# Patient Record
Sex: MALE | Race: WHITE | Employment: FULL TIME | URBAN - NONMETROPOLITAN AREA
[De-identification: names, ages, dates, MRNs, and addresses within clinical notes are randomized per-mention and may not be internally consistent; named-entity substitution may affect disease eponyms.]

---

## 2020-10-27 ENCOUNTER — HOSPITAL ENCOUNTER (INPATIENT)
Age: 50
LOS: 3 days | Discharge: HOME | DRG: 639 | End: 2020-10-30
Attending: HOSPITALIST | Admitting: FAMILY MEDICINE

## 2020-10-27 ENCOUNTER — APPOINTMENT (OUTPATIENT)
Dept: GENERAL RADIOLOGY | Age: 50
DRG: 639 | End: 2020-10-27
Attending: HOSPITALIST

## 2020-10-27 LAB
ANION GAP SERPL CALCULATED.3IONS-SCNC: 22 MMOL/L (ref 7–19)
ANION GAP SERPL CALCULATED.3IONS-SCNC: 27 MMOL/L (ref 7–19)
BUN BLDV-MCNC: 27 MG/DL (ref 6–20)
BUN BLDV-MCNC: 29 MG/DL (ref 6–20)
CALCIUM SERPL-MCNC: 8.4 MG/DL (ref 8.6–10)
CALCIUM SERPL-MCNC: 8.7 MG/DL (ref 8.6–10)
CHLORIDE BLD-SCNC: 100 MMOL/L (ref 98–111)
CHLORIDE BLD-SCNC: 99 MMOL/L (ref 98–111)
CO2: 2 MMOL/L (ref 22–29)
CO2: 6 MMOL/L (ref 22–29)
CREAT SERPL-MCNC: 1 MG/DL (ref 0.5–1.2)
CREAT SERPL-MCNC: 1 MG/DL (ref 0.5–1.2)
GFR AFRICAN AMERICAN: >59
GFR AFRICAN AMERICAN: >59
GFR NON-AFRICAN AMERICAN: >60
GFR NON-AFRICAN AMERICAN: >60
GLUCOSE BLD-MCNC: 104 MG/DL (ref 70–99)
GLUCOSE BLD-MCNC: 132 MG/DL (ref 70–99)
GLUCOSE BLD-MCNC: 151 MG/DL (ref 70–99)
GLUCOSE BLD-MCNC: 154 MG/DL (ref 74–109)
GLUCOSE BLD-MCNC: 178 MG/DL (ref 70–99)
GLUCOSE BLD-MCNC: 186 MG/DL (ref 70–99)
GLUCOSE BLD-MCNC: 186 MG/DL (ref 70–99)
GLUCOSE BLD-MCNC: 189 MG/DL (ref 70–99)
GLUCOSE BLD-MCNC: 191 MG/DL (ref 70–99)
GLUCOSE BLD-MCNC: 196 MG/DL (ref 70–99)
GLUCOSE BLD-MCNC: 203 MG/DL (ref 70–99)
GLUCOSE BLD-MCNC: 203 MG/DL (ref 74–109)
GLUCOSE BLD-MCNC: 270 MG/DL (ref 70–99)
GLUCOSE BLD-MCNC: 59 MG/DL (ref 70–99)
GLUCOSE BLD-MCNC: 68 MG/DL (ref 70–99)
GLUCOSE BLD-MCNC: 77 MG/DL (ref 70–99)
MAGNESIUM: 1.6 MG/DL (ref 1.6–2.6)
MAGNESIUM: 1.8 MG/DL (ref 1.6–2.6)
PERFORMED ON: ABNORMAL
PERFORMED ON: NORMAL
PHOSPHORUS: 1.1 MG/DL (ref 2.5–4.5)
PHOSPHORUS: 2.3 MG/DL (ref 2.5–4.5)
POTASSIUM SERPL-SCNC: 3.6 MMOL/L (ref 3.5–5)
POTASSIUM SERPL-SCNC: 4.1 MMOL/L (ref 3.5–5)
REASON FOR REJECTION: NORMAL
REJECTED TEST: NORMAL
SARS-COV-2, PCR: NOT DETECTED
SODIUM BLD-SCNC: 128 MMOL/L (ref 136–145)
SODIUM BLD-SCNC: 128 MMOL/L (ref 136–145)

## 2020-10-27 PROCEDURE — 2000000000 HC ICU R&B

## 2020-10-27 PROCEDURE — C9113 INJ PANTOPRAZOLE SODIUM, VIA: HCPCS | Performed by: HOSPITALIST

## 2020-10-27 PROCEDURE — 6370000000 HC RX 637 (ALT 250 FOR IP): Performed by: INTERNAL MEDICINE

## 2020-10-27 PROCEDURE — 80048 BASIC METABOLIC PNL TOTAL CA: CPT

## 2020-10-27 PROCEDURE — 6360000002 HC RX W HCPCS: Performed by: INTERNAL MEDICINE

## 2020-10-27 PROCEDURE — 74018 RADEX ABDOMEN 1 VIEW: CPT

## 2020-10-27 PROCEDURE — U0003 INFECTIOUS AGENT DETECTION BY NUCLEIC ACID (DNA OR RNA); SEVERE ACUTE RESPIRATORY SYNDROME CORONAVIRUS 2 (SARS-COV-2) (CORONAVIRUS DISEASE [COVID-19]), AMPLIFIED PROBE TECHNIQUE, MAKING USE OF HIGH THROUGHPUT TECHNOLOGIES AS DESCRIBED BY CMS-2020-01-R: HCPCS

## 2020-10-27 PROCEDURE — 2580000003 HC RX 258: Performed by: INTERNAL MEDICINE

## 2020-10-27 PROCEDURE — 36415 COLL VENOUS BLD VENIPUNCTURE: CPT

## 2020-10-27 PROCEDURE — 2580000003 HC RX 258: Performed by: HOSPITALIST

## 2020-10-27 PROCEDURE — 82947 ASSAY GLUCOSE BLOOD QUANT: CPT

## 2020-10-27 PROCEDURE — 2500000003 HC RX 250 WO HCPCS: Performed by: INTERNAL MEDICINE

## 2020-10-27 PROCEDURE — 84100 ASSAY OF PHOSPHORUS: CPT

## 2020-10-27 PROCEDURE — 83735 ASSAY OF MAGNESIUM: CPT

## 2020-10-27 PROCEDURE — 6360000002 HC RX W HCPCS: Performed by: HOSPITALIST

## 2020-10-27 RX ORDER — POTASSIUM CHLORIDE 7.45 MG/ML
10 INJECTION INTRAVENOUS PRN
Status: DISCONTINUED | OUTPATIENT
Start: 2020-10-27 | End: 2020-10-30 | Stop reason: HOSPADM

## 2020-10-27 RX ORDER — INSULIN GLARGINE 100 [IU]/ML
INJECTION, SOLUTION SUBCUTANEOUS NIGHTLY
Status: ON HOLD | COMMUNITY
End: 2020-10-30 | Stop reason: HOSPADM

## 2020-10-27 RX ORDER — HYDROMORPHONE HYDROCHLORIDE 1 MG/ML
0.5 INJECTION, SOLUTION INTRAMUSCULAR; INTRAVENOUS; SUBCUTANEOUS EVERY 4 HOURS PRN
Status: DISCONTINUED | OUTPATIENT
Start: 2020-10-27 | End: 2020-10-30 | Stop reason: HOSPADM

## 2020-10-27 RX ORDER — PROMETHAZINE HYDROCHLORIDE 25 MG/ML
6.25 INJECTION, SOLUTION INTRAMUSCULAR; INTRAVENOUS EVERY 6 HOURS PRN
Status: DISCONTINUED | OUTPATIENT
Start: 2020-10-27 | End: 2020-10-30 | Stop reason: HOSPADM

## 2020-10-27 RX ORDER — SODIUM CHLORIDE 450 MG/100ML
INJECTION, SOLUTION INTRAVENOUS CONTINUOUS
Status: DISCONTINUED | OUTPATIENT
Start: 2020-10-27 | End: 2020-10-29

## 2020-10-27 RX ORDER — DEXTROSE AND SODIUM CHLORIDE 5; .45 G/100ML; G/100ML
INJECTION, SOLUTION INTRAVENOUS CONTINUOUS PRN
Status: DISCONTINUED | OUTPATIENT
Start: 2020-10-27 | End: 2020-10-30 | Stop reason: HOSPADM

## 2020-10-27 RX ORDER — SODIUM CHLORIDE 9 MG/ML
10 INJECTION INTRAVENOUS 2 TIMES DAILY
Status: DISCONTINUED | OUTPATIENT
Start: 2020-10-27 | End: 2020-10-30 | Stop reason: HOSPADM

## 2020-10-27 RX ORDER — DEXTROSE MONOHYDRATE 25 G/50ML
12.5 INJECTION, SOLUTION INTRAVENOUS PRN
Status: DISCONTINUED | OUTPATIENT
Start: 2020-10-27 | End: 2020-10-30 | Stop reason: HOSPADM

## 2020-10-27 RX ORDER — PANTOPRAZOLE SODIUM 40 MG/10ML
40 INJECTION, POWDER, LYOPHILIZED, FOR SOLUTION INTRAVENOUS 2 TIMES DAILY
Status: DISCONTINUED | OUTPATIENT
Start: 2020-10-27 | End: 2020-10-29

## 2020-10-27 RX ORDER — 0.9 % SODIUM CHLORIDE 0.9 %
15 INTRAVENOUS SOLUTION INTRAVENOUS ONCE
Status: DISCONTINUED | OUTPATIENT
Start: 2020-10-27 | End: 2020-10-29

## 2020-10-27 RX ORDER — MAGNESIUM SULFATE 1 G/100ML
1 INJECTION INTRAVENOUS PRN
Status: DISCONTINUED | OUTPATIENT
Start: 2020-10-27 | End: 2020-10-30 | Stop reason: HOSPADM

## 2020-10-27 RX ADMIN — SODIUM CHLORIDE, PRESERVATIVE FREE 10 ML: 5 INJECTION INTRAVENOUS at 20:38

## 2020-10-27 RX ADMIN — SODIUM CHLORIDE 2.73 UNITS/HR: 9 INJECTION, SOLUTION INTRAVENOUS at 14:15

## 2020-10-27 RX ADMIN — MAGNESIUM SULFATE HEPTAHYDRATE 1 G: 1 INJECTION, SOLUTION INTRAVENOUS at 22:43

## 2020-10-27 RX ADMIN — POTASSIUM CHLORIDE 10 MEQ: 7.46 INJECTION, SOLUTION INTRAVENOUS at 23:44

## 2020-10-27 RX ADMIN — MAGNESIUM SULFATE HEPTAHYDRATE 1 G: 1 INJECTION, SOLUTION INTRAVENOUS at 21:50

## 2020-10-27 RX ADMIN — HYDROMORPHONE HYDROCHLORIDE 0.5 MG: 1 INJECTION, SOLUTION INTRAMUSCULAR; INTRAVENOUS; SUBCUTANEOUS at 20:37

## 2020-10-27 RX ADMIN — POTASSIUM CHLORIDE 10 MEQ: 7.46 INJECTION, SOLUTION INTRAVENOUS at 22:47

## 2020-10-27 RX ADMIN — SODIUM PHOSPHATE, MONOBASIC, MONOHYDRATE 20 MMOL: 276; 142 INJECTION, SOLUTION INTRAVENOUS at 22:56

## 2020-10-27 RX ADMIN — PANTOPRAZOLE SODIUM 40 MG: 40 INJECTION, POWDER, FOR SOLUTION INTRAVENOUS at 20:37

## 2020-10-27 RX ADMIN — DEXTROSE AND SODIUM CHLORIDE: 5; 450 INJECTION, SOLUTION INTRAVENOUS at 11:10

## 2020-10-27 ASSESSMENT — PAIN SCALES - GENERAL
PAINLEVEL_OUTOF10: 4
PAINLEVEL_OUTOF10: 8

## 2020-10-27 ASSESSMENT — ENCOUNTER SYMPTOMS
CONSTIPATION: 0
ABDOMINAL PAIN: 1
VOMITING: 1
VOICE CHANGE: 0
COLOR CHANGE: 0
SHORTNESS OF BREATH: 1
DIARRHEA: 0
NAUSEA: 1
BACK PAIN: 0

## 2020-10-27 NOTE — H&P
Hospital Medicine H&P    Patient:  Cynthia Christian  MRN: [id number]    Consulting Physician: Hafsa Ramon DO  Reason for Consult: Medical Management  Primacy Care Physician: No primary care provider on file. HISTORY OF PRESENT ILLNESS:   The patient is a 53 y.o. male who was transferred from an outlying facility in [address] [place] [address] because of DKA. He states that he has been ill for about 3 days. He has had some vomiting. Diffuse abdominal pain. He was diagnosed with diabetes 5 years ago. He has been admitted for DKA in the past.  He was markedly acidemic at the outlying facility. He has been admitted to the ICU with the DKA pathway. Past Medical History:        Diagnosis Date    Diabetes mellitus ([facility] [address]     Hyperlipidemia     Hypertension        Past Surgical History:        Procedure Laterality Date    SKIN BIOPSY      1 MOLE       Medications: Scheduled Meds:   enoxaparin  40 mg Subcutaneous Q24H    sodium chloride  15 mL/kg Intravenous Once     Continuous Infusions:   sodium chloride      dextrose 5 % and 0.45 % NaCl 150 mL/hr at 10/27/20 1110    insulin 5.04 Units/hr (10/27/20 1807)     PRN Meds:.dextrose, potassium chloride, magnesium sulfate, sodium phosphate IVPB **OR** sodium phosphate IVPB **OR** sodium phosphate IVPB, dextrose 5 % and 0.45 % NaCl    Allergies:  Pcn [penicillins]    Social History:   TOBACCO:   reports that he has never smoked. He has never used smokeless tobacco.  ETOH:   reports current alcohol use of about 86.0 standard drinks of alcohol per week. Family History:       Problem Relation Age of Onset    Cancer Father     Diabetes Brother        ROS:  Review of Systems   Constitutional: Positive for activity change and fatigue. Negative for fever. HENT: Negative for congestion and voice change. Eyes: Negative for visual disturbance. Respiratory: Positive for shortness of breath. Cardiovascular: Negative for chest pain and leg swelling. Gastrointestinal: Positive for abdominal pain, nausea and vomiting. Negative for constipation and diarrhea. Endocrine: Negative for polyuria. Genitourinary: Negative for difficulty urinating and dysuria. Musculoskeletal: Positive for gait problem. Negative for back pain and neck pain. Skin: Negative for color change. Allergic/Immunologic: Negative for immunocompromised state. Neurological: Positive for weakness. Negative for dizziness and light-headedness. Psychiatric/Behavioral: The patient is not nervous/anxious. Physical Exam:    Vitals: /67   Pulse 95   Temp 96 °F (35.6 °C) (Temporal)   Resp 23   Ht 5' 6\" (1.676 m) Comment: 56  Wt 150 lb 8 oz (68.3 kg)   SpO2 100%   BMI 24.29 kg/m²     Physical Exam  Vitals signs and nursing note reviewed. Constitutional:       General: He is in acute distress. HENT:      Head: Normocephalic and atraumatic. Right Ear: External ear normal.      Left Ear: External ear normal.      Nose: Nose normal.      Mouth/Throat:      Mouth: Mucous membranes are moist.   Eyes:      Conjunctiva/sclera: Conjunctivae normal.      Pupils: Pupils are equal, round, and reactive to light. Neck:      Musculoskeletal: Neck supple. No neck rigidity. Cardiovascular:      Rate and Rhythm: Normal rate and regular rhythm. Heart sounds: Normal heart sounds. Pulmonary:      Breath sounds: Normal breath sounds. Comments: Tachypneic  Abdominal:      General: Abdomen is flat. Palpations: Abdomen is soft. Musculoskeletal:         General: No swelling. Skin:     General: Skin is warm and dry. Neurological:      Mental Status: He is oriented to person, place, and time. Psychiatric:         Mood and Affect: Mood normal.         Thought Content: Thought content normal.         CBC: No results for input(s): WBC, HGB, PLT in the last 72 hours.   BMP:    Recent Labs     10/27/20  1549   *   K 4.1   CL 99   CO2 2*   BUN 27*   CREATININE 1.0 GLUCOSE 203*     Hepatic: No results for input(s): AST, ALT, ALB, BILITOT, ALKPHOS in the last 72 hours. Troponin: No results for input(s): TROPONINI in the last 72 hours. BNP: No results for input(s): BNP in the last 72 hours. Lipids: No results for input(s): CHOL, HDL in the last 72 hours. Invalid input(s): LDLCALCU  ABGs: No results found for: PHART, PO2ART, MCK7BDV  INR: No results for input(s): INR in the last 72 hours. -----------------------------------------------------------------  No results found. Assessment and Plan     DKA. Admit to ICU. DKA pathway. Aggressive fluid resuscitation. Please document 40 minutes of critical care time for chart review, patient assessment and documentation.       Anthony Swift DO

## 2020-10-27 NOTE — PROGRESS NOTES
Moved self on to bed from stretcher. Settled into room with bedside monitor. accucheck 59.  Turned insulin drip off from transfering Megan Daniels Lock of events and arrival.

## 2020-10-27 NOTE — PROGRESS NOTES
Laquita Notice arrived to room # 149. Presented with: DKA  Mental Status: Patient is oriented, alert, coherent, logical and thought processes intact. Vitals:    10/27/20 1500   BP: 110/66   Pulse: 94   Resp: 30   Temp:    SpO2: 100%     Patient safety contract and falls prevention contract reviewed with patient No.  Oriented Patient to room. Call light within reach. Yes. Needs, issues or concerns expressed at this time: yes, need to go to bathroom urinal given*.       Electronically signed by Filipe Segal RN on 10/27/2020 at 3:31 PM

## 2020-10-28 LAB
ANION GAP SERPL CALCULATED.3IONS-SCNC: 11 MMOL/L (ref 7–19)
ANION GAP SERPL CALCULATED.3IONS-SCNC: 11 MMOL/L (ref 7–19)
ANION GAP SERPL CALCULATED.3IONS-SCNC: 12 MMOL/L (ref 7–19)
ANION GAP SERPL CALCULATED.3IONS-SCNC: 13 MMOL/L (ref 7–19)
ANION GAP SERPL CALCULATED.3IONS-SCNC: 17 MMOL/L (ref 7–19)
ANION GAP SERPL CALCULATED.3IONS-SCNC: 18 MMOL/L (ref 7–19)
BASE EXCESS ARTERIAL: -11.5 MMOL/L (ref -2–2)
BUN BLDV-MCNC: 21 MG/DL (ref 6–20)
BUN BLDV-MCNC: 23 MG/DL (ref 6–20)
BUN BLDV-MCNC: 23 MG/DL (ref 6–20)
BUN BLDV-MCNC: 26 MG/DL (ref 6–20)
BUN BLDV-MCNC: 28 MG/DL (ref 6–20)
BUN BLDV-MCNC: 29 MG/DL (ref 6–20)
CALCIUM SERPL-MCNC: 8.4 MG/DL (ref 8.6–10)
CALCIUM SERPL-MCNC: 8.5 MG/DL (ref 8.6–10)
CALCIUM SERPL-MCNC: 8.9 MG/DL (ref 8.6–10)
CALCIUM SERPL-MCNC: 9.1 MG/DL (ref 8.6–10)
CARBOXYHEMOGLOBIN ARTERIAL: 0.8 % (ref 0–5)
CHLORIDE BLD-SCNC: 100 MMOL/L (ref 98–111)
CHLORIDE BLD-SCNC: 101 MMOL/L (ref 98–111)
CHLORIDE BLD-SCNC: 105 MMOL/L (ref 98–111)
CHLORIDE BLD-SCNC: 99 MMOL/L (ref 98–111)
CO2: 10 MMOL/L (ref 22–29)
CO2: 11 MMOL/L (ref 22–29)
CO2: 15 MMOL/L (ref 22–29)
CO2: 15 MMOL/L (ref 22–29)
CO2: 16 MMOL/L (ref 22–29)
CO2: 17 MMOL/L (ref 22–29)
CREAT SERPL-MCNC: 1 MG/DL (ref 0.5–1.2)
CREAT SERPL-MCNC: 1 MG/DL (ref 0.5–1.2)
CREAT SERPL-MCNC: 1.1 MG/DL (ref 0.5–1.2)
CREAT SERPL-MCNC: 1.2 MG/DL (ref 0.5–1.2)
GFR AFRICAN AMERICAN: >59
GFR NON-AFRICAN AMERICAN: >60
GLUCOSE BLD-MCNC: 125 MG/DL (ref 70–99)
GLUCOSE BLD-MCNC: 127 MG/DL (ref 70–99)
GLUCOSE BLD-MCNC: 134 MG/DL (ref 74–109)
GLUCOSE BLD-MCNC: 144 MG/DL (ref 70–99)
GLUCOSE BLD-MCNC: 147 MG/DL (ref 70–99)
GLUCOSE BLD-MCNC: 153 MG/DL (ref 74–109)
GLUCOSE BLD-MCNC: 163 MG/DL (ref 70–99)
GLUCOSE BLD-MCNC: 166 MG/DL (ref 70–99)
GLUCOSE BLD-MCNC: 167 MG/DL (ref 70–99)
GLUCOSE BLD-MCNC: 176 MG/DL (ref 74–109)
GLUCOSE BLD-MCNC: 177 MG/DL (ref 70–99)
GLUCOSE BLD-MCNC: 185 MG/DL (ref 74–109)
GLUCOSE BLD-MCNC: 187 MG/DL (ref 70–99)
GLUCOSE BLD-MCNC: 197 MG/DL (ref 70–99)
GLUCOSE BLD-MCNC: 215 MG/DL (ref 74–109)
GLUCOSE BLD-MCNC: 221 MG/DL (ref 74–109)
GLUCOSE BLD-MCNC: 223 MG/DL (ref 70–99)
GLUCOSE BLD-MCNC: 249 MG/DL (ref 70–99)
GLUCOSE BLD-MCNC: 276 MG/DL (ref 70–99)
GLUCOSE BLD-MCNC: 292 MG/DL (ref 70–99)
GLUCOSE BLD-MCNC: 305 MG/DL (ref 70–99)
GLUCOSE BLD-MCNC: 367 MG/DL (ref 70–99)
HCO3 ARTERIAL: 12.4 MMOL/L (ref 22–26)
HEMOGLOBIN, ART, EXTENDED: 12.2 G/DL (ref 14–18)
MAGNESIUM: 2 MG/DL (ref 1.6–2.6)
MAGNESIUM: 2.1 MG/DL (ref 1.6–2.6)
MAGNESIUM: 2.4 MG/DL (ref 1.6–2.6)
METHEMOGLOBIN ARTERIAL: 0.9 %
O2 CONTENT ARTERIAL: 16.7 ML/DL
O2 SAT, ARTERIAL: 96.4 %
O2 THERAPY: ABNORMAL
PCO2 ARTERIAL: 23 MMHG (ref 35–45)
PERFORMED ON: ABNORMAL
PH ARTERIAL: 7.34 (ref 7.35–7.45)
PHOSPHORUS: 0.9 MG/DL (ref 2.5–4.5)
PHOSPHORUS: 1.2 MG/DL (ref 2.5–4.5)
PHOSPHORUS: 1.6 MG/DL (ref 2.5–4.5)
PHOSPHORUS: 1.8 MG/DL (ref 2.5–4.5)
PHOSPHORUS: 2.3 MG/DL (ref 2.5–4.5)
PHOSPHORUS: 2.4 MG/DL (ref 2.5–4.5)
PO2 ARTERIAL: 106 MMHG (ref 80–100)
POTASSIUM SERPL-SCNC: 3.1 MMOL/L (ref 3.5–5)
POTASSIUM SERPL-SCNC: 3.5 MMOL/L (ref 3.5–5)
POTASSIUM SERPL-SCNC: 3.5 MMOL/L (ref 3.5–5)
POTASSIUM SERPL-SCNC: 3.6 MMOL/L (ref 3.5–5)
POTASSIUM SERPL-SCNC: 3.7 MMOL/L (ref 3.5–5)
POTASSIUM SERPL-SCNC: 3.7 MMOL/L (ref 3.5–5)
POTASSIUM, WHOLE BLOOD: 3.1
SODIUM BLD-SCNC: 127 MMOL/L (ref 136–145)
SODIUM BLD-SCNC: 128 MMOL/L (ref 136–145)
SODIUM BLD-SCNC: 129 MMOL/L (ref 136–145)
SODIUM BLD-SCNC: 131 MMOL/L (ref 136–145)
SODIUM BLD-SCNC: 133 MMOL/L (ref 136–145)
SODIUM BLD-SCNC: 133 MMOL/L (ref 136–145)

## 2020-10-28 PROCEDURE — 2000000000 HC ICU R&B

## 2020-10-28 PROCEDURE — 2580000003 HC RX 258: Performed by: INTERNAL MEDICINE

## 2020-10-28 PROCEDURE — 83735 ASSAY OF MAGNESIUM: CPT

## 2020-10-28 PROCEDURE — 6360000002 HC RX W HCPCS: Performed by: INTERNAL MEDICINE

## 2020-10-28 PROCEDURE — 2500000003 HC RX 250 WO HCPCS: Performed by: INTERNAL MEDICINE

## 2020-10-28 PROCEDURE — C9113 INJ PANTOPRAZOLE SODIUM, VIA: HCPCS | Performed by: HOSPITALIST

## 2020-10-28 PROCEDURE — 36600 WITHDRAWAL OF ARTERIAL BLOOD: CPT

## 2020-10-28 PROCEDURE — 6360000002 HC RX W HCPCS: Performed by: HOSPITALIST

## 2020-10-28 PROCEDURE — 80048 BASIC METABOLIC PNL TOTAL CA: CPT

## 2020-10-28 PROCEDURE — 84132 ASSAY OF SERUM POTASSIUM: CPT

## 2020-10-28 PROCEDURE — 2580000003 HC RX 258: Performed by: HOSPITALIST

## 2020-10-28 PROCEDURE — 82803 BLOOD GASES ANY COMBINATION: CPT

## 2020-10-28 PROCEDURE — 84100 ASSAY OF PHOSPHORUS: CPT

## 2020-10-28 PROCEDURE — 6370000000 HC RX 637 (ALT 250 FOR IP): Performed by: HOSPITALIST

## 2020-10-28 PROCEDURE — 36415 COLL VENOUS BLD VENIPUNCTURE: CPT

## 2020-10-28 PROCEDURE — 6370000000 HC RX 637 (ALT 250 FOR IP): Performed by: INTERNAL MEDICINE

## 2020-10-28 PROCEDURE — 82947 ASSAY GLUCOSE BLOOD QUANT: CPT

## 2020-10-28 RX ORDER — INSULIN GLARGINE 100 [IU]/ML
10 INJECTION, SOLUTION SUBCUTANEOUS 2 TIMES DAILY
Status: DISCONTINUED | OUTPATIENT
Start: 2020-10-28 | End: 2020-10-29

## 2020-10-28 RX ORDER — DEXTROSE MONOHYDRATE 25 G/50ML
12.5 INJECTION, SOLUTION INTRAVENOUS PRN
Status: DISCONTINUED | OUTPATIENT
Start: 2020-10-28 | End: 2020-10-30 | Stop reason: HOSPADM

## 2020-10-28 RX ORDER — NICOTINE POLACRILEX 4 MG
15 LOZENGE BUCCAL PRN
Status: DISCONTINUED | OUTPATIENT
Start: 2020-10-28 | End: 2020-10-30 | Stop reason: HOSPADM

## 2020-10-28 RX ORDER — DEXTROSE MONOHYDRATE 50 MG/ML
100 INJECTION, SOLUTION INTRAVENOUS PRN
Status: DISCONTINUED | OUTPATIENT
Start: 2020-10-28 | End: 2020-10-30 | Stop reason: HOSPADM

## 2020-10-28 RX ADMIN — PROMETHAZINE HYDROCHLORIDE 6.25 MG: 25 INJECTION INTRAMUSCULAR; INTRAVENOUS at 02:48

## 2020-10-28 RX ADMIN — SODIUM CHLORIDE 14.67 UNITS/HR: 9 INJECTION, SOLUTION INTRAVENOUS at 04:17

## 2020-10-28 RX ADMIN — INSULIN LISPRO 1 UNITS: 100 INJECTION, SOLUTION INTRAVENOUS; SUBCUTANEOUS at 21:04

## 2020-10-28 RX ADMIN — POTASSIUM CHLORIDE 10 MEQ: 7.46 INJECTION, SOLUTION INTRAVENOUS at 07:37

## 2020-10-28 RX ADMIN — POTASSIUM CHLORIDE 10 MEQ: 7.46 INJECTION, SOLUTION INTRAVENOUS at 23:05

## 2020-10-28 RX ADMIN — SODIUM CHLORIDE 4.35 UNITS/HR: 9 INJECTION, SOLUTION INTRAVENOUS at 10:12

## 2020-10-28 RX ADMIN — SODIUM CHLORIDE 4.02 UNITS/HR: 9 INJECTION, SOLUTION INTRAVENOUS at 08:57

## 2020-10-28 RX ADMIN — POTASSIUM CHLORIDE 10 MEQ: 7.46 INJECTION, SOLUTION INTRAVENOUS at 02:45

## 2020-10-28 RX ADMIN — POTASSIUM CHLORIDE 10 MEQ: 7.46 INJECTION, SOLUTION INTRAVENOUS at 01:45

## 2020-10-28 RX ADMIN — POTASSIUM CHLORIDE 10 MEQ: 7.46 INJECTION, SOLUTION INTRAVENOUS at 20:53

## 2020-10-28 RX ADMIN — SODIUM CHLORIDE, PRESERVATIVE FREE 10 ML: 5 INJECTION INTRAVENOUS at 19:44

## 2020-10-28 RX ADMIN — POTASSIUM CHLORIDE 10 MEQ: 7.46 INJECTION, SOLUTION INTRAVENOUS at 05:53

## 2020-10-28 RX ADMIN — SODIUM PHOSPHATE, MONOBASIC, MONOHYDRATE 10 MMOL: 276; 142 INJECTION, SOLUTION INTRAVENOUS at 20:54

## 2020-10-28 RX ADMIN — Medication 10 UNITS: at 21:03

## 2020-10-28 RX ADMIN — DEXTROSE AND SODIUM CHLORIDE: 5; 450 INJECTION, SOLUTION INTRAVENOUS at 00:37

## 2020-10-28 RX ADMIN — POTASSIUM CHLORIDE 10 MEQ: 7.46 INJECTION, SOLUTION INTRAVENOUS at 19:44

## 2020-10-28 RX ADMIN — POTASSIUM CHLORIDE 10 MEQ: 7.46 INJECTION, SOLUTION INTRAVENOUS at 00:45

## 2020-10-28 RX ADMIN — POTASSIUM & SODIUM PHOSPHATES POWDER PACK 280-160-250 MG 250 MG: 280-160-250 PACK at 12:41

## 2020-10-28 RX ADMIN — ENOXAPARIN SODIUM 40 MG: 40 INJECTION SUBCUTANEOUS at 12:42

## 2020-10-28 RX ADMIN — SODIUM CHLORIDE 6.35 UNITS/HR: 9 INJECTION, SOLUTION INTRAVENOUS at 11:14

## 2020-10-28 RX ADMIN — POTASSIUM CHLORIDE 10 MEQ: 7.46 INJECTION, SOLUTION INTRAVENOUS at 22:01

## 2020-10-28 RX ADMIN — POTASSIUM CHLORIDE 10 MEQ: 7.46 INJECTION, SOLUTION INTRAVENOUS at 08:45

## 2020-10-28 RX ADMIN — PANTOPRAZOLE SODIUM 40 MG: 40 INJECTION, POWDER, FOR SOLUTION INTRAVENOUS at 19:44

## 2020-10-28 RX ADMIN — INSULIN LISPRO 6 UNITS: 100 INJECTION, SOLUTION INTRAVENOUS; SUBCUTANEOUS at 22:25

## 2020-10-28 RX ADMIN — SODIUM PHOSPHATE, MONOBASIC, MONOHYDRATE 20 MMOL: 276; 142 INJECTION, SOLUTION INTRAVENOUS at 05:54

## 2020-10-28 RX ADMIN — SODIUM CHLORIDE, PRESERVATIVE FREE 10 ML: 5 INJECTION INTRAVENOUS at 07:49

## 2020-10-28 RX ADMIN — POTASSIUM CHLORIDE 10 MEQ: 7.46 INJECTION, SOLUTION INTRAVENOUS at 05:03

## 2020-10-28 RX ADMIN — POTASSIUM & SODIUM PHOSPHATES POWDER PACK 280-160-250 MG 250 MG: 280-160-250 PACK at 19:09

## 2020-10-28 RX ADMIN — PANTOPRAZOLE SODIUM 40 MG: 40 INJECTION, POWDER, FOR SOLUTION INTRAVENOUS at 07:49

## 2020-10-28 ASSESSMENT — PAIN SCALES - GENERAL
PAINLEVEL_OUTOF10: 0

## 2020-10-28 NOTE — PROGRESS NOTES
Results for Claudia Samuels (MRN [id number]) as of 10/28/2020 07:26   Ref.  Range 10/28/2020 07:25   Hemoglobin, Art, Extended Latest Ref Range: 14.0 - 18.0 g/dL 12.2 (L)   pH, Arterial Latest Ref Range: 7.350 - 7.450  7.340 (L)   pCO2, Arterial Latest Ref Range: 35.0 - 45.0 mmHg 23.0 (L)   pO2, Arterial Latest Ref Range: 80.0 - 100.0 mmHg 106.0 (H)   HCO3, Arterial Latest Ref Range: 22.0 - 26.0 mmol/L 12.4 (L)   Base Excess, Arterial Latest Ref Range: -2.0 - 2.0 mmol/L -11.5 (L)   O2 Sat, Arterial Latest Ref Range: >92 % 96.4   O2 Content, Arterial Latest Ref Range: Not Established mL/dL 16.7   Methemoglobin, Arterial Latest Ref Range: <1.5 % 0.9   Carboxyhgb, Arterial Latest Ref Range: 0.0 - 5.0 % 0.8   Patient on Room Air  LR  +AT

## 2020-10-28 NOTE — PROGRESS NOTES
Hospitalist Progress Note  [facility]     Patient: Sylvia Florence  : 1970  MRN: [id number]  Code Status: Full Code    Hospital Day: 1   Date of Service: 10/28/2020    Subjective:   Pt seen and examined. No current complaints. Past Medical History:   Diagnosis Date    Diabetes mellitus ([address]     Hyperlipidemia     Hypertension        Past Surgical History:   Procedure Laterality Date    SKIN BIOPSY      1 MOLE       Family History   Problem Relation Age of Onset    Cancer Father     Diabetes Brother        Social History     Socioeconomic History    Marital status: Single     Spouse name: Not on file    Number of children: 0    Years of education: 15    Highest education level: Not on file   Occupational History    Not on file   Social Needs    Financial resource strain: Not on file    Food insecurity     Worry: Not on file     Inability: Not on file   [employer] needs     Medical: Not on file     Non-medical: Not on file   Tobacco Use    Smoking status: Never Smoker    Smokeless tobacco: Never Used   Substance and Sexual Activity    Alcohol use:  Yes     Alcohol/week: 86.0 standard drinks     Types: 86 Cans of beer per week     Comment: 12 CANS A DAY    Drug use: Yes     Frequency: 3.0 times per week     Types: Marijuana    Sexual activity: Not on file   Lifestyle    Physical activity     Days per week: Not on file     Minutes per session: Not on file    Stress: Not on file   Relationships    Social connections     Talks on phone: Not on file     Gets together: Not on file     Attends Christian service: Not on file     Active member of club or organization: Not on file     Attends meetings of clubs or organizations: Not on file     Relationship status: Not on file    Intimate partner violence     Fear of current or ex partner: Not on file     Emotionally abused: Not on file     Physically abused: Not on file     Forced sexual activity: Not on file   Other Topics Concern    Not on file   Social History Narrative    Not on file       Current Facility-Administered Medications   Medication Dose Route Frequency Provider Last Rate Last Dose    enoxaparin (LOVENOX) injection 40 mg  40 mg Subcutaneous Q24H Donalee Heck, DO        dextrose 50 % IV solution  12.5 g Intravenous PRN Donalee Heck, DO        potassium chloride 10 mEq/100 mL IVPB (Peripheral Line)  10 mEq Intravenous PRN Donalee Heck,  mL/hr at 10/28/20 0845 10 mEq at 10/28/20 0845    magnesium sulfate 1 g in dextrose 5% 100 mL IVPB  1 g Intravenous PRN Donalee Heck, DO   Stopped at 10/27/20 2345    sodium phosphate 10 mmol in dextrose 5 % 250 mL IVPB  10 mmol Intravenous PRN Donalee Heck, DO        Or    sodium phosphate 15 mmol in dextrose 5 % 250 mL IVPB  15 mmol Intravenous PRN Donalee Heck, DO        Or    sodium phosphate 20 mmol in dextrose 5 % 500 mL IVPB  20 mmol Intravenous PRN Donalee Heck, DO 83.3 mL/hr at 10/28/20 0554 20 mmol at 10/28/20 0554    0.9 % sodium chloride bolus  15 mL/kg Intravenous Once Donalee Heck, DO        Followed by   Luisa Gupta 0.45 % sodium chloride infusion   Intravenous Continuous Donalee Heck, DO        dextrose 5 % and 0.45 % sodium chloride infusion   Intravenous Continuous PRN Donalee Heck,  mL/hr at 10/28/20 0037      insulin regular (HUMULIN R;NOVOLIN R) 100 Units in sodium chloride 0.9 % 100 mL infusion  0.1 Units/kg/hr Intravenous Continuous Donalee Heck, DO 4 mL/hr at 10/28/20 0857 4.02 Units/hr at 10/28/20 0857    promethazine (PHENERGAN) injection 6.25 mg  6.25 mg Intravenous Q6H PRN Aidan Cox MD   6.25 mg at 10/28/20 0248    HYDROmorphone HCl PF (DILAUDID) injection 0.5 mg  0.5 mg Intravenous Q4H PRN Aidan Cox MD   0.5 mg at 10/27/20 2037    pantoprazole (PROTONIX) injection 40 mg  40 mg Intravenous BID Aidan Cox MD   40 mg at 10/28/20 0749    And    sodium chloride (PF) 0.9 % injection 10 mL  10 mL Intravenous BID Allie Mcgarry MD   10 mL at 10/27/20 2038         sodium chloride      dextrose 5 % and 0.45 % NaCl 150 mL/hr at 10/28/20 0037    insulin 4.02 Units/hr (10/28/20 0857)        Objective:   BP (!) 103/52   Pulse 96   Temp 99 °F (37.2 °C) (Temporal)   Resp 16   Ht 5' 6\" (1.676 m) Comment: 56  Wt 147 lb 3.2 oz (66.8 kg)   SpO2 100%   BMI 23.76 kg/m²     General: no acute distress  HEENT: normocephalic, atraumatic  Neck: supple, symmetrical, trachea midline   Lungs: clear to auscultation bilaterally   Cardiovascular: s1 and s2 normal  Abdomen: soft, positive bowel sounds, nondistended, nontender  Extremities: no edema or cyanosis   Neuro: aaox3, no focal deficits   Skin: normal color and texture     No results for input(s): WBC, RBC, HGB, HCT, MCV, MCH, MCHC, PLT in the last 72 hours. Recent Labs     10/28/20  0019 10/28/20  0404 10/28/20  0725 10/28/20  0801   * 128*  --  129*   K 3.6 3.5 3.1 3.5   ANIONGAP 18 17  --  13   CL 99 100  --  101   CO2 10* 11*  --  15*   BUN 29* 28*  --  26*   CREATININE 1.1 1.1  --  1.0   GLUCOSE 221* 176*  --  134*   CALCIUM 8.4* 8.5*  --  9.1     Recent Labs     10/28/20  0019 10/28/20  0404 10/28/20  0801   MG 2.4 2.0 2.1   PHOS 1.2* 0.9* 1.6*     No results for input(s): AST, ALT, ALB, BILITOT, ALKPHOS, ALB in the last 72 hours. No results for input(s): PH, PO2, PCO2, HCO3, BE, O2SAT in the last 72 hours. No results for input(s): TROPONINI in the last 72 hours. No results for input(s): INR in the last 72 hours. No results for input(s): LACTA in the last 72 hours.       Intake/Output Summary (Last 24 hours) at 10/28/2020 1012  Last data filed at 10/28/2020 0847  Gross per 24 hour   Intake 2661.45 ml   Output 2800 ml   Net -138.55 ml       Xr Abdomen (kub) (single Ap View)    Result Date: 10/27/2020  XR ABDOMEN (KUB) (SINGLE AP VIEW) 10/27/2020 8:05 PM HISTORY:

## 2020-10-29 LAB
ANION GAP SERPL CALCULATED.3IONS-SCNC: 10 MMOL/L (ref 7–19)
ANION GAP SERPL CALCULATED.3IONS-SCNC: 13 MMOL/L (ref 7–19)
ANION GAP SERPL CALCULATED.3IONS-SCNC: 17 MMOL/L (ref 7–19)
ANION GAP SERPL CALCULATED.3IONS-SCNC: 17 MMOL/L (ref 7–19)
BASOPHILS ABSOLUTE: 0 K/UL (ref 0–0.2)
BASOPHILS RELATIVE PERCENT: 0 % (ref 0–1)
BUN BLDV-MCNC: 21 MG/DL (ref 6–20)
BUN BLDV-MCNC: 22 MG/DL (ref 6–20)
CALCIUM SERPL-MCNC: 8.6 MG/DL (ref 8.6–10)
CALCIUM SERPL-MCNC: 8.6 MG/DL (ref 8.6–10)
CALCIUM SERPL-MCNC: 8.7 MG/DL (ref 8.6–10)
CALCIUM SERPL-MCNC: 9 MG/DL (ref 8.6–10)
CHLORIDE BLD-SCNC: 108 MMOL/L (ref 98–111)
CHLORIDE BLD-SCNC: 109 MMOL/L (ref 98–111)
CO2: 12 MMOL/L (ref 22–29)
CO2: 13 MMOL/L (ref 22–29)
CO2: 16 MMOL/L (ref 22–29)
CO2: 16 MMOL/L (ref 22–29)
CREAT SERPL-MCNC: 1.1 MG/DL (ref 0.5–1.2)
EOSINOPHILS ABSOLUTE: 0 K/UL (ref 0–0.6)
EOSINOPHILS RELATIVE PERCENT: 0 % (ref 0–5)
GFR AFRICAN AMERICAN: >59
GFR NON-AFRICAN AMERICAN: >60
GLUCOSE BLD-MCNC: 105 MG/DL (ref 70–99)
GLUCOSE BLD-MCNC: 133 MG/DL (ref 70–99)
GLUCOSE BLD-MCNC: 136 MG/DL (ref 70–99)
GLUCOSE BLD-MCNC: 139 MG/DL (ref 70–99)
GLUCOSE BLD-MCNC: 162 MG/DL (ref 70–99)
GLUCOSE BLD-MCNC: 169 MG/DL (ref 70–99)
GLUCOSE BLD-MCNC: 181 MG/DL (ref 70–99)
GLUCOSE BLD-MCNC: 187 MG/DL (ref 74–109)
GLUCOSE BLD-MCNC: 198 MG/DL (ref 70–99)
GLUCOSE BLD-MCNC: 206 MG/DL (ref 70–99)
GLUCOSE BLD-MCNC: 225 MG/DL (ref 70–99)
GLUCOSE BLD-MCNC: 250 MG/DL (ref 74–109)
GLUCOSE BLD-MCNC: 254 MG/DL (ref 70–99)
GLUCOSE BLD-MCNC: 270 MG/DL (ref 74–109)
GLUCOSE BLD-MCNC: 272 MG/DL (ref 70–99)
GLUCOSE BLD-MCNC: 279 MG/DL (ref 74–109)
GLUCOSE BLD-MCNC: 72 MG/DL (ref 70–99)
HCT VFR BLD CALC: 31.1 % (ref 42–52)
HEMOGLOBIN: 10.9 G/DL (ref 14–18)
IMMATURE GRANULOCYTES #: 0 K/UL
LACTIC ACID: 1.2 MMOL/L (ref 0.5–1.9)
LYMPHOCYTES ABSOLUTE: 0.7 K/UL (ref 1.1–4.5)
LYMPHOCYTES RELATIVE PERCENT: 15.8 % (ref 20–40)
MAGNESIUM: 2 MG/DL (ref 1.6–2.6)
MAGNESIUM: 2.1 MG/DL (ref 1.6–2.6)
MCH RBC QN AUTO: 31.4 PG (ref 27–31)
MCHC RBC AUTO-ENTMCNC: 35 G/DL (ref 33–37)
MCV RBC AUTO: 89.6 FL (ref 80–94)
MONOCYTES ABSOLUTE: 0.8 K/UL (ref 0–0.9)
MONOCYTES RELATIVE PERCENT: 16.5 % (ref 0–10)
NEUTROPHILS ABSOLUTE: 3.1 K/UL (ref 1.5–7.5)
NEUTROPHILS RELATIVE PERCENT: 66.8 % (ref 50–65)
PDW BLD-RTO: 13.2 % (ref 11.5–14.5)
PERFORMED ON: ABNORMAL
PERFORMED ON: NORMAL
PHOSPHORUS: 1.3 MG/DL (ref 2.5–4.5)
PHOSPHORUS: 2.1 MG/DL (ref 2.5–4.5)
PHOSPHORUS: 2.7 MG/DL (ref 2.5–4.5)
PHOSPHORUS: 3.4 MG/DL (ref 2.5–4.5)
PLATELET # BLD: 77 K/UL (ref 130–400)
PMV BLD AUTO: 10 FL (ref 9.4–12.4)
POTASSIUM SERPL-SCNC: 3.1 MMOL/L (ref 3.5–5)
POTASSIUM SERPL-SCNC: 3.2 MMOL/L (ref 3.5–5)
POTASSIUM SERPL-SCNC: 3.8 MMOL/L (ref 3.5–5)
POTASSIUM SERPL-SCNC: 3.8 MMOL/L (ref 3.5–5)
RBC # BLD: 3.47 M/UL (ref 4.7–6.1)
SODIUM BLD-SCNC: 134 MMOL/L (ref 136–145)
SODIUM BLD-SCNC: 137 MMOL/L (ref 136–145)
SODIUM BLD-SCNC: 138 MMOL/L (ref 136–145)
SODIUM BLD-SCNC: 138 MMOL/L (ref 136–145)
WBC # BLD: 4.6 K/UL (ref 4.8–10.8)

## 2020-10-29 PROCEDURE — 6360000002 HC RX W HCPCS: Performed by: HOSPITALIST

## 2020-10-29 PROCEDURE — 85025 COMPLETE CBC W/AUTO DIFF WBC: CPT

## 2020-10-29 PROCEDURE — 2580000003 HC RX 258: Performed by: INTERNAL MEDICINE

## 2020-10-29 PROCEDURE — 6370000000 HC RX 637 (ALT 250 FOR IP): Performed by: INTERNAL MEDICINE

## 2020-10-29 PROCEDURE — 6360000002 HC RX W HCPCS: Performed by: INTERNAL MEDICINE

## 2020-10-29 PROCEDURE — 80048 BASIC METABOLIC PNL TOTAL CA: CPT

## 2020-10-29 PROCEDURE — 84100 ASSAY OF PHOSPHORUS: CPT

## 2020-10-29 PROCEDURE — 6370000000 HC RX 637 (ALT 250 FOR IP): Performed by: HOSPITALIST

## 2020-10-29 PROCEDURE — 82947 ASSAY GLUCOSE BLOOD QUANT: CPT

## 2020-10-29 PROCEDURE — 36415 COLL VENOUS BLD VENIPUNCTURE: CPT

## 2020-10-29 PROCEDURE — 83605 ASSAY OF LACTIC ACID: CPT

## 2020-10-29 PROCEDURE — 1210000000 HC MED SURG R&B

## 2020-10-29 PROCEDURE — 83735 ASSAY OF MAGNESIUM: CPT

## 2020-10-29 PROCEDURE — 2580000003 HC RX 258: Performed by: HOSPITALIST

## 2020-10-29 PROCEDURE — C9113 INJ PANTOPRAZOLE SODIUM, VIA: HCPCS | Performed by: HOSPITALIST

## 2020-10-29 RX ORDER — INSULIN GLARGINE 100 [IU]/ML
60 INJECTION, SOLUTION SUBCUTANEOUS DAILY
Status: DISCONTINUED | OUTPATIENT
Start: 2020-10-29 | End: 2020-10-30 | Stop reason: HOSPADM

## 2020-10-29 RX ORDER — PANTOPRAZOLE SODIUM 40 MG/1
40 TABLET, DELAYED RELEASE ORAL
Status: DISCONTINUED | OUTPATIENT
Start: 2020-10-30 | End: 2020-10-30 | Stop reason: HOSPADM

## 2020-10-29 RX ORDER — INSULIN GLARGINE 100 [IU]/ML
20 INJECTION, SOLUTION SUBCUTANEOUS 2 TIMES DAILY
Status: DISCONTINUED | OUTPATIENT
Start: 2020-10-29 | End: 2020-10-29

## 2020-10-29 RX ADMIN — SODIUM CHLORIDE 6 UNITS/HR: 9 INJECTION, SOLUTION INTRAVENOUS at 05:18

## 2020-10-29 RX ADMIN — SODIUM CHLORIDE, PRESERVATIVE FREE 10 ML: 5 INJECTION INTRAVENOUS at 09:00

## 2020-10-29 RX ADMIN — INSULIN LISPRO 2 UNITS: 100 INJECTION, SOLUTION INTRAVENOUS; SUBCUTANEOUS at 07:49

## 2020-10-29 RX ADMIN — INSULIN LISPRO 2 UNITS: 100 INJECTION, SOLUTION INTRAVENOUS; SUBCUTANEOUS at 18:19

## 2020-10-29 RX ADMIN — PANTOPRAZOLE SODIUM 40 MG: 40 INJECTION, POWDER, FOR SOLUTION INTRAVENOUS at 09:00

## 2020-10-29 RX ADMIN — INSULIN LISPRO 5 UNITS: 100 INJECTION, SOLUTION INTRAVENOUS; SUBCUTANEOUS at 12:00

## 2020-10-29 RX ADMIN — INSULIN GLARGINE 60 UNITS: 100 INJECTION, SOLUTION SUBCUTANEOUS at 10:30

## 2020-10-29 RX ADMIN — INSULIN LISPRO 3 UNITS: 100 INJECTION, SOLUTION INTRAVENOUS; SUBCUTANEOUS at 21:11

## 2020-10-29 RX ADMIN — POTASSIUM CHLORIDE 10 MEQ: 7.46 INJECTION, SOLUTION INTRAVENOUS at 15:13

## 2020-10-29 RX ADMIN — POTASSIUM & SODIUM PHOSPHATES POWDER PACK 280-160-250 MG 250 MG: 280-160-250 PACK at 12:00

## 2020-10-29 RX ADMIN — SODIUM CHLORIDE, PRESERVATIVE FREE 10 ML: 5 INJECTION INTRAVENOUS at 19:58

## 2020-10-29 RX ADMIN — POTASSIUM CHLORIDE 10 MEQ: 7.46 INJECTION, SOLUTION INTRAVENOUS at 18:19

## 2020-10-29 RX ADMIN — ENOXAPARIN SODIUM 40 MG: 40 INJECTION SUBCUTANEOUS at 12:00

## 2020-10-29 RX ADMIN — INSULIN LISPRO 5 UNITS: 100 INJECTION, SOLUTION INTRAVENOUS; SUBCUTANEOUS at 18:20

## 2020-10-29 RX ADMIN — POTASSIUM & SODIUM PHOSPHATES POWDER PACK 280-160-250 MG 250 MG: 280-160-250 PACK at 09:00

## 2020-10-29 RX ADMIN — POTASSIUM CHLORIDE 10 MEQ: 7.46 INJECTION, SOLUTION INTRAVENOUS at 19:56

## 2020-10-29 RX ADMIN — POTASSIUM CHLORIDE 10 MEQ: 7.46 INJECTION, SOLUTION INTRAVENOUS at 13:27

## 2020-10-29 ASSESSMENT — PAIN SCALES - GENERAL
PAINLEVEL_OUTOF10: 0

## 2020-10-29 NOTE — PROGRESS NOTES
on file   Other Topics Concern    Not on file   Social History Narrative    Not on file       Current Facility-Administered Medications   Medication Dose Route Frequency Provider Last Rate Last Dose    insulin glargine (LANTUS) injection vial 60 Units  60 Units Subcutaneous Daily Enrique Avila MD   60 Units at 10/29/20 1030    insulin lispro (HUMALOG) injection vial 5 Units  5 Units Subcutaneous TID  Enrique Avila MD        potassium & sodium phosphates (PHOS-NAK) 280-160-250 MG packet 250 mg  1 packet Oral 4x Daily Enrique Avila MD   250 mg at 10/29/20 0900    insulin lispro (HUMALOG) injection vial 0-12 Units  0-12 Units Subcutaneous TID  Dea Michele MD   2 Units at 10/29/20 0749    insulin lispro (HUMALOG) injection vial 0-6 Units  0-6 Units Subcutaneous Nightly Dea Michele MD   6 Units at 10/28/20 2225    glucose (GLUTOSE) 40 % oral gel 15 g  15 g Oral PRN Dea Michele MD        dextrose 50 % IV solution  12.5 g Intravenous PRN Dea Michele MD        glucagon (rDNA) injection 1 mg  1 mg Intramuscular PRN Dea Michele MD        dextrose 5 % solution  100 mL/hr Intravenous PRN Dea Michele MD        enoxaparin (LOVENOX) injection 40 mg  40 mg Subcutaneous Q24H Nash Duane, DO   40 mg at 10/28/20 1242    dextrose 50 % IV solution  12.5 g Intravenous PRN Nash Duane, DO        potassium chloride 10 mEq/100 mL IVPB (Peripheral Line)  10 mEq Intravenous PRN Nash Duane,  mL/hr at 10/28/20 2305 10 mEq at 10/28/20 2305    magnesium sulfate 1 g in dextrose 5% 100 mL IVPB  1 g Intravenous PRN Nash Duane, DO   Stopped at 10/27/20 2345    sodium phosphate 10 mmol in dextrose 5 % 250 mL IVPB  10 mmol Intravenous PRN Nash Duane, DO   Stopped at 10/29/20 0142    Or    sodium phosphate 15 mmol in dextrose 5 % 250 mL IVPB  15 mmol Intravenous PRN Nash Duane, DO        Or    sodium phosphate 20 mmol in dextrose 5 % 500 mL IVPB  20 mmol Intravenous PRN Kody Hernandezguson, DO   Stopped at 10/28/20 1155    0.9 % sodium chloride bolus  15 mL/kg Intravenous Once Kody Paulino DO        Followed by   Creston Sicard 0.45 % sodium chloride infusion   Intravenous Continuous Kody DO Lora        dextrose 5 % and 0.45 % sodium chloride infusion   Intravenous Continuous PRN Kody Paulino, DO   Stopped at 10/28/20 2013    promethazine (PHENERGAN) injection 6.25 mg  6.25 mg Intravenous Q6H PRN Juanna Saint, MD   6.25 mg at 10/28/20 0248    HYDROmorphone HCl PF (DILAUDID) injection 0.5 mg  0.5 mg Intravenous Q4H PRN Juanna Saint, MD   0.5 mg at 10/27/20 2037    pantoprazole (PROTONIX) injection 40 mg  40 mg Intravenous BID Juanna Saint, MD   40 mg at 10/29/20 0900    And    sodium chloride (PF) 0.9 % injection 10 mL  10 mL Intravenous BID Juanna Saint, MD   10 mL at 10/29/20 0900         dextrose      sodium chloride      dextrose 5 % and 0.45 % NaCl Stopped (10/28/20 2013)        Objective:   /62   Pulse 96   Temp 98.4 °F (36.9 °C) (Temporal)   Resp 21   Ht 5' 6\" (1.676 m) Comment: 56  Wt 145 lb 6.4 oz (66 kg)   SpO2 98%   BMI 23.47 kg/m²     General: no acute distress  HEENT: normocephalic, atraumatic  Neck: supple, symmetrical, trachea midline   Lungs: clear to auscultation bilaterally   Cardiovascular: s1 and s2 normal  Abdomen: soft, positive bowel sounds, nondistended, nontender  Extremities: no edema or cyanosis   Neuro: aaox3, no focal deficits   Skin: normal color and texture     Recent Labs     10/29/20  0344   WBC 4.6*   RBC 3.47*   HGB 10.9*   HCT 31.1*   MCV 89.6   MCH 31.4*   MCHC 35.0   PLT 77*     Recent Labs     10/29/20  0344 10/29/20  0617 10/29/20  0731    138 138   K 3.8 3.2* 3.1*   ANIONGAP 17 17 13    108 109   CO2 12* 13* 16*   BUN 21* 21* 22*   CREATININE 1.1 1.1 1.1   GLUCOSE 279* 270* 187* CALCIUM 8.7 8.6 9.0     Recent Labs     10/29/20  0344 10/29/20  0617 10/29/20  0731   MG 2.1 2.1 2.1   PHOS 3.4 2.1* 1.3*     No results for input(s): AST, ALT, ALB, BILITOT, ALKPHOS, ALB in the last 72 hours. No results for input(s): PH, PO2, PCO2, HCO3, BE, O2SAT in the last 72 hours. No results for input(s): TROPONINI in the last 72 hours. No results for input(s): INR in the last 72 hours. Recent Labs     10/29/20  0617   LACTA 1.2         Intake/Output Summary (Last 24 hours) at 10/29/2020 1205  Last data filed at 10/29/2020 1000  Gross per 24 hour   Intake 4443.52 ml   Output 3025 ml   Net 1418.52 ml       Xr Abdomen (kub) (single Ap View)    Result Date: 10/27/2020  XR ABDOMEN (KUB) (SINGLE AP VIEW) 10/27/2020 8:05 PM HISTORY: Abdominal pain COMPARISON: None FINDINGS: There is a nonobstructive bowel gas pattern. Solitary rounded calcification in the right upper abdomen, just below the inferior liver margin. Evaluation for free intraperitoneal air is limited on a supine radiograph, but there are no definite findings of free intraperitoneal air. The osseous structures demonstrate no acute abnormality. 1. No radiographic evidence of acute abdominal process. 2. Solitary rounded calcification in the right upper quadrant, either a nonobstructing upper pole renal stone or perhaps radiodense gallbladder stone.  Signed by Dr Darrick Griffin on 10/27/2020 9:26 PM       Assessment and Plan:   DKA  Etiology noncompliance with meds, counseled  Insulin gtt, transitioned to sc insulin 10/29/2020  IVF administered  Tolerating diet  Follow lytes/accuchecks  Diabetes educator  Social work for financial assistance with meds     DVT prophylaxis  Lovenox    Lupillo Ba MD   10/29/2020 12:05 PM

## 2020-10-29 NOTE — CONSULTS
Comprehensive Nutrition Assessment    Type and Reason for Visit:  Initial, Consult, Patient Education    Nutrition Assessment:  Consult received for DM diet education. Pt states he does not follow a diet at home but tried to when he was first diagnosed with DM. We discussed carbohydrate containing foods, portion sizing, and consistent carbohydrate intake. Pt was very agreeable and receptive. Provided written materials as well as contact name and info to pt. Malnutrition Assessment:  Malnutrition Status:  No malnutrition    Context:  Acute Illness       Current Nutrition Therapies:    DIET CARB CONTROL; Anthropometric Measures:  · Height: 5' 6\" (167.6 cm)  · Current Body Weight: 145 lb 6.4 oz (66 kg)   · Ideal Body Weight: 142 lbs; % Ideal Body Weight 102.4 %   · BMI: 23.5  · BMI Categories: Normal Weight (BMI 18.5-24. 9)       Nutrition Diagnosis:   · Food & Nutrition-related knowledge deficit related to endocrine dysfuntion as evidenced by lab values      Nutrition Interventions:   Food and/or Nutrient Delivery:  Continue Current Diet  Nutrition Education/Counseling:  Education completed   Coordination of Nutrition Care:  Continue to monitor while inpatient    Goals:  Understanding of/adherence to DM diet modifications       Nutrition Monitoring and Evaluation:   Behavioral-Environmental Outcomes:  Knowledge or Skill, Beliefs and Attitutes   Food/Nutrient Intake Outcomes:  Food and Nutrient Intake  Physical Signs/Symptoms Outcomes:  Biochemical Data, Weight     Discharge Planning:    Continue current diet     Electronically signed by Win Mackenzie RD, LD on 10/29/20 at 3:51 PM CDT    Contact: [phone]

## 2020-10-29 NOTE — CARE COORDINATION
Pt lives at home in [facility]; Mira Robbins with [employer] public benefits visited and completed financial application with the hospital; but cannot help with Medicaid application as Pt doesn't live in [place]    Electronically signed by Heraclio Sebastian on 10/29/2020 at 2:57 PM

## 2020-10-30 VITALS
OXYGEN SATURATION: 100 % | RESPIRATION RATE: 20 BRPM | WEIGHT: 144.4 LBS | BODY MASS INDEX: 23.21 KG/M2 | HEIGHT: 66 IN | HEART RATE: 82 BPM | DIASTOLIC BLOOD PRESSURE: 68 MMHG | SYSTOLIC BLOOD PRESSURE: 105 MMHG | TEMPERATURE: 98.8 F

## 2020-10-30 LAB
ANION GAP SERPL CALCULATED.3IONS-SCNC: 10 MMOL/L (ref 7–19)
BASOPHILS ABSOLUTE: 0 K/UL (ref 0–0.2)
BASOPHILS RELATIVE PERCENT: 0.3 % (ref 0–1)
BUN BLDV-MCNC: 19 MG/DL (ref 6–20)
CALCIUM SERPL-MCNC: 8.9 MG/DL (ref 8.6–10)
CHLORIDE BLD-SCNC: 111 MMOL/L (ref 98–111)
CO2: 18 MMOL/L (ref 22–29)
CREAT SERPL-MCNC: 1.2 MG/DL (ref 0.5–1.2)
EOSINOPHILS ABSOLUTE: 0 K/UL (ref 0–0.6)
EOSINOPHILS RELATIVE PERCENT: 0.3 % (ref 0–5)
GFR AFRICAN AMERICAN: >59
GFR NON-AFRICAN AMERICAN: >60
GLUCOSE BLD-MCNC: 144 MG/DL (ref 74–109)
GLUCOSE BLD-MCNC: 177 MG/DL (ref 70–99)
GLUCOSE BLD-MCNC: 184 MG/DL (ref 70–99)
HBA1C MFR BLD: 12.5 % (ref 4–6)
HCT VFR BLD CALC: 31.4 % (ref 42–52)
HEMOGLOBIN: 10.8 G/DL (ref 14–18)
IMMATURE GRANULOCYTES #: 0 K/UL
LYMPHOCYTES ABSOLUTE: 1.4 K/UL (ref 1.1–4.5)
LYMPHOCYTES RELATIVE PERCENT: 36 % (ref 20–40)
MAGNESIUM: 2.1 MG/DL (ref 1.6–2.6)
MCH RBC QN AUTO: 32 PG (ref 27–31)
MCHC RBC AUTO-ENTMCNC: 34.4 G/DL (ref 33–37)
MCV RBC AUTO: 92.9 FL (ref 80–94)
MONOCYTES ABSOLUTE: 0.6 K/UL (ref 0–0.9)
MONOCYTES RELATIVE PERCENT: 15.2 % (ref 0–10)
NEUTROPHILS ABSOLUTE: 1.8 K/UL (ref 1.5–7.5)
NEUTROPHILS RELATIVE PERCENT: 47.4 % (ref 50–65)
PDW BLD-RTO: 13.7 % (ref 11.5–14.5)
PERFORMED ON: ABNORMAL
PERFORMED ON: ABNORMAL
PHOSPHORUS: 2.5 MG/DL (ref 2.5–4.5)
PLATELET # BLD: 89 K/UL (ref 130–400)
PMV BLD AUTO: 9.6 FL (ref 9.4–12.4)
POTASSIUM SERPL-SCNC: 3.5 MMOL/L (ref 3.5–5)
RBC # BLD: 3.38 M/UL (ref 4.7–6.1)
SODIUM BLD-SCNC: 139 MMOL/L (ref 136–145)
WBC # BLD: 3.8 K/UL (ref 4.8–10.8)

## 2020-10-30 PROCEDURE — 6370000000 HC RX 637 (ALT 250 FOR IP): Performed by: INTERNAL MEDICINE

## 2020-10-30 PROCEDURE — 84100 ASSAY OF PHOSPHORUS: CPT

## 2020-10-30 PROCEDURE — 82947 ASSAY GLUCOSE BLOOD QUANT: CPT

## 2020-10-30 PROCEDURE — 83036 HEMOGLOBIN GLYCOSYLATED A1C: CPT

## 2020-10-30 PROCEDURE — 80048 BASIC METABOLIC PNL TOTAL CA: CPT

## 2020-10-30 PROCEDURE — 83735 ASSAY OF MAGNESIUM: CPT

## 2020-10-30 PROCEDURE — 85025 COMPLETE CBC W/AUTO DIFF WBC: CPT

## 2020-10-30 PROCEDURE — 36415 COLL VENOUS BLD VENIPUNCTURE: CPT

## 2020-10-30 PROCEDURE — 6370000000 HC RX 637 (ALT 250 FOR IP): Performed by: HOSPITALIST

## 2020-10-30 RX ORDER — GLUCOSAMINE HCL/CHONDROITIN SU 500-400 MG
CAPSULE ORAL
Qty: 300 STRIP | Refills: 0 | Status: CANCELLED | OUTPATIENT
Start: 2020-10-30

## 2020-10-30 RX ORDER — INSULIN GLARGINE 100 [IU]/ML
60 INJECTION, SOLUTION SUBCUTANEOUS DAILY
Qty: 1 VIAL | Refills: 0 | Status: SHIPPED | OUTPATIENT
Start: 2020-10-31

## 2020-10-30 RX ORDER — LANCETS 30 GAUGE
1 EACH MISCELLANEOUS 3 TIMES DAILY
Qty: 600 EACH | Refills: 1 | Status: CANCELLED | OUTPATIENT
Start: 2020-10-30

## 2020-10-30 RX ADMIN — INSULIN GLARGINE 60 UNITS: 100 INJECTION, SOLUTION SUBCUTANEOUS at 08:37

## 2020-10-30 RX ADMIN — INSULIN LISPRO 5 UNITS: 100 INJECTION, SOLUTION INTRAVENOUS; SUBCUTANEOUS at 13:49

## 2020-10-30 RX ADMIN — PANTOPRAZOLE SODIUM 40 MG: 40 TABLET, DELAYED RELEASE ORAL at 06:19

## 2020-10-30 RX ADMIN — INSULIN LISPRO 2 UNITS: 100 INJECTION, SOLUTION INTRAVENOUS; SUBCUTANEOUS at 13:49

## 2020-10-30 ASSESSMENT — PAIN SCALES - GENERAL: PAINLEVEL_OUTOF10: 0

## 2020-10-30 NOTE — DISCHARGE SUMMARY
Discharge Summary  Date:10/30/2020        Patient Name:Delvis Rees     Date of Birth:[birth year]     Age:50 y.o. Admit Date:10/27/2020   Admission Condition:poor   Discharged Condition:good  Discharge Date: 10/30/20     Discharge Diagnoses   Active Problems:    * No active hospital problems. *  Resolved Problems:    * No resolved hospital problems. [facility] Stay   Narrative of Hospital Course: Patient was admitted 10/27, transferred from outlying facility so the surgery due to DKA. He stated he was ill for approximately 3 days prior, noted to be acidemic admitted to the intensive care unit and started on DKA management. Electrolytes were monitored and corrected. Noted noncompliance of medications patient currently does not have insurance, social work consultation placed for assistance with financial matters as well as medications. Blood sugar gradually improved, patient was tolerating a diet well. Transition to regular medical floor 10/29/2020. Patient was seen by dietary services, Public benefits financial application was completed. He was cleared for DC 10/30/2020, will be sent home on basal Lantus regimen, premeal insulin as noted hemoglobin A1c greater than 12. Patient to follow-up with primary care provider in 1 to 2 weeks for hospital discharge as well as ongoing coordination of care. Education modalities been attached after visit summary. Consultants:   IP CONSULT TO DIABETES EDUCATOR  IP CONSULT TO SOCIAL WORK  IP CONSULT TO HOME CARE NEEDS    Time Spent on Discharge:  45 minutes were spent in patient examination, evaluation, counseling as well as medication reconciliation, prescriptions for required medications, discharge plan and follow up.       Surgeries/Procedures Performed:       Treatments:   IV hydration, analgesia: Dilaudid, anticoagulation: LMW heparin, insulin: Humalog and Lantus, respiratory therapy: O2 and electrolyte stabilization    Significant Diagnostic Studies: Recent Labs:  CBC:   Lab Results   Component Value Date    WBC 3.8 10/30/2020    RBC 3.38 10/30/2020    HGB 10.8 10/30/2020    HCT 31.4 10/30/2020    MCV 92.9 10/30/2020    MCH 32.0 10/30/2020    MCHC 34.4 10/30/2020    RDW 13.7 10/30/2020    PLT 89 10/30/2020     BMP:    Lab Results   Component Value Date    GLUCOSE 144 10/30/2020     10/30/2020    K 3.5 10/30/2020     10/30/2020    CO2 18 10/30/2020    ANIONGAP 10 10/30/2020    BUN 19 10/30/2020    CREATININE 1.2 10/30/2020    CALCIUM 8.9 10/30/2020    LABGLOM >60 10/30/2020    GFRAA >59 10/30/2020     HFP:  No results found for: ALB, PROT  CMP:    Lab Results   Component Value Date    GLUCOSE 144 10/30/2020     10/30/2020    K 3.5 10/30/2020     10/30/2020    CO2 18 10/30/2020    BUN 19 10/30/2020    CREATININE 1.2 10/30/2020    ANIONGAP 10 10/30/2020    LABGLOM >60 10/30/2020    GFRAA >59 10/30/2020    CALCIUM 8.9 10/30/2020     PT/INR:  No results found for: PTINR, PROTIME, INR  PTT: No results found for: APTT  FLP:  No results found for: CHOL, TRIG, HDL  U/A:  No results found for: COLORU, TURBIDITY, SPECGRAV, HGBUR, PHUR, PROTEINU, GLUCOSEU, KETUA, BILIRUBINUR, UROBILINOGEN, NITRU, LEUKOCYTESUR  TSH:  No results found for: TSH    Radiology Last 7 Days:  Xr Abdomen (kub) (single Ap View)    Result Date: 10/27/2020  1. No radiographic evidence of acute abdominal process. 2. Solitary rounded calcification in the right upper quadrant, either a nonobstructing upper pole renal stone or perhaps radiodense gallbladder stone. Signed by Dr Sathya Brooke on 10/27/2020 9:26 PM    Physical Exam  Vitals signs and nursing note reviewed. Constitutional:       General: He is not in acute distress. Appearance: He is not ill-appearing or toxic-appearing. HENT:      Head: Normocephalic and atraumatic. Nose: Nose normal.      Mouth/Throat:      Pharynx: No oropharyngeal exudate or posterior oropharyngeal erythema.    Eyes:      General: Much of this encounter note is an electronic transcription/translation of spoken language to printed text.  The electronic translation of spoken language may permit erroneous, or at times, nonsensical words or phrases to be inadvertently transcribed; although attempts have made to review the note for such errors, some may still exist.    Electronically signed by   Yunier Griggs   Internal Medicine Hospitalist  On 10/30/2020  At 12:02 PM

## 2020-10-30 NOTE — PLAN OF CARE
Patient pending DC this afternoon.   Home care needs pending regarding patient home insulin care and cost.
Problem: Pain:  Goal: Pain level will decrease  Description: Pain level will decrease  Outcome: Ongoing  Goal: Control of acute pain  Description: Control of acute pain  Outcome: Ongoing  Goal: Control of chronic pain  Description: Control of chronic pain  Outcome: Ongoing     Problem: Falls - Risk of:  Goal: Will remain free from falls  Description: Will remain free from falls  Outcome: Ongoing  Goal: Absence of physical injury  Description: Absence of physical injury  Outcome: Ongoing     Problem: Skin Integrity:  Goal: Will show no infection signs and symptoms  Description: Will show no infection signs and symptoms  Outcome: Ongoing  Goal: Absence of new skin breakdown  Description: Absence of new skin breakdown  Outcome: Ongoing
Yes-Patient/Caregiver accepts free interpretation services...

## 2020-10-30 NOTE — PROGRESS NOTES
CLINICAL PHARMACY NOTE: MEDS TO [address] Select Patient?: No  Total # of Prescriptions Filled: 2   The following medications were delivered to the patient:  · Lantus  · Humalog  Total # of Interventions Completed: 1  Time Spent (min): 30    Additional Documentation:  [id number] B analyst to determine pricing could be reduced for [facility] handed off to nurseAndrei    Electronically signed by Artie Lazcano, [address] [place] [address] on 10/30/2020 at 3:42 PM

## 2020-10-30 NOTE — PROGRESS NOTES
Short transfer note:    Patient admitted with DKA secondary to noncompliance with home insulin. Insulin gtt transitioned to sc insulin on 10/29/2020. Tolerating diet. Social work following for financial assistance with meds as patient states he has not been able to afford insulin for 2 years.     Lary Lennox, MD  10/29/2020

## 2020-10-30 NOTE — CARE COORDINATION
Spoke to patient regarding MD orders for home health services. Patient does not want home health services. Has no insurance and cannot afford it. 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 States he has a glucometer and checks his own BS. Also the nurses at the SNF will be glad to check his VS and answer any questions he has regarding his diabetes. Is established with a PCP, Dr Encarnacion Has, and does see him for follow up with his diabetes. Will sign off.   Electronically signed by Tree Toribio RN on 10/30/20 at 11:45 AM CDT

## 2020-10-30 NOTE — CARE COORDINATION
Date / Time of Evaluation: 10/30/2020 2:12 PM  Assessment Completed by: Kimmie Patel    Patient Admission Status: Inpatient [101]      Current PCP:  No primary care provider on file. PCP verified? Yes-Dr. King [place], [place]    Initial Assessment Completed at bedside with:  Pt    Emergency Contacts:  Extended Emergency Contact Information  Primary Emergency Contact: Abbott Gambles  Home Phone: [phone]  Relation: Parent  Preferred language: English   needed? No    Advance Directives: Code Status:  Full Code    Financial:  Payor: /     Pre-Cert required for SNF:  no    Have Long Term Care Insurance:  no    Pharmacy:   Lorena Morocho [address] [facility], [address] [facility] [address]  [address]  [address] [place] [address]  Phone: [phone] Fax: [phone]    [facility] - [facility], [address] [place] [address] [phone] [place] [facility] [phone]  [address]  [facility] [address]  Phone: [phone] Fax: [phone]      Potential assistance purchasing medications?   Yes=he has no insurance; plans to get set-up with [place] Medicaid     ADLS:  Support System:  Friends/Neighbors, Family Members, Spouse/Significant Other    Current Home Environment:  home  Steps:  no    Plans to RETURN to current housing: yes  Barriers to RETURNING to current housing:  none    Currently ACTIVE with [address]:  no  [address]:  no    DME Provider:  no    Had HOME OXYGEN prior to admission:  no      Active with HD/PD prior to admission: no  Nephrologist:  no  HD Center:  no    Transition Plan:  per RN-non-compliant DKA; etoh use; possible medication/financial issues    Transportation PLAN for Discharge:  Personal vehicle    Factors facilitating achievement of predicted outcomes: assistance given re: medications and finances    Barriers to discharge:  none      Additional CM/SW Notes: 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 Caleb Zee office in [facility]; ODILIA placed call to that office; scheduled his appt with one of Dr. Caleb Zee NP's, Jaylene Krueger 11/6/20 at [place] [address];           Jasmin Mosley and/or his family were provided with choice of provider.         Keo Mendes [address]  Care Management Department  Ph:  [phone]   Fax: [phone]  Electronically signed by ROMINA Mendes on 10/30/2020 at 2:19 PM

## 2020-10-30 NOTE — CARE COORDINATION
Per Rebel Champagne, pharmacist with [facility]; Pt would qualify for the 340b program and cost would be 30.18 for his insulins    Electronically signed by ROMINA De Dios on 10/30/2020 at 2:51 PM

## 2020-10-30 NOTE — PROGRESS NOTES
Patient DC home family at bedside. Lantus/Humalog given to patient to take home. Insulin needles also supplied to cover until follow up with primary next week. IV access x 2 discontinued.